# Patient Record
Sex: FEMALE | Race: BLACK OR AFRICAN AMERICAN | NOT HISPANIC OR LATINO | Employment: OTHER | URBAN - METROPOLITAN AREA
[De-identification: names, ages, dates, MRNs, and addresses within clinical notes are randomized per-mention and may not be internally consistent; named-entity substitution may affect disease eponyms.]

---

## 2019-06-27 ENCOUNTER — TELEPHONE (OUTPATIENT)
Dept: SPORTS MEDICINE | Facility: CLINIC | Age: 44
End: 2019-06-27

## 2019-06-27 NOTE — TELEPHONE ENCOUNTER
Moved pt. to earlier appt.     Sundeep Pruitt   Sports Medicine Assistant   [facility]     ----- Message from Lori Soto sent at 6/27/2019 11:50 AM CDT -----  Contact: self  Type:  Sooner Appointment Request    Patient has appointment scheduled with Dr Brown for 8/26/2019   Patient need appointment for one day next week    Name of Caller:  Patient   When is the first available appointment?  Symptoms: Tennis elbow  Would the patient rather a call back or a response via [facility]? call  Best Call Back Number: [phone]  Additional Information

## 2019-07-08 ENCOUNTER — OFFICE VISIT (OUTPATIENT)
Dept: ORTHOPEDICS | Facility: CLINIC | Age: 44
End: 2019-07-08
Payer: MEDICAID

## 2019-07-08 VITALS
HEIGHT: 66 IN | SYSTOLIC BLOOD PRESSURE: 120 MMHG | WEIGHT: 212 LBS | BODY MASS INDEX: 34.07 KG/M2 | DIASTOLIC BLOOD PRESSURE: 80 MMHG

## 2019-07-08 DIAGNOSIS — M25.521 RIGHT ELBOW PAIN: Primary | ICD-10-CM

## 2019-07-08 DIAGNOSIS — M79.631 RIGHT FOREARM PAIN: ICD-10-CM

## 2019-07-08 DIAGNOSIS — M77.11 RIGHT LATERAL EPICONDYLITIS: ICD-10-CM

## 2019-07-08 PROCEDURE — 99204 PR OFFICE/OUTPT VISIT, NEW, LEVL IV, 45-59 MIN: ICD-10-PCS | Mod: S$PBB,,, | Performed by: ORTHOPAEDIC SURGERY

## 2019-07-08 PROCEDURE — 99204 OFFICE O/P NEW MOD 45 MIN: CPT | Mod: S$PBB,,, | Performed by: ORTHOPAEDIC SURGERY

## 2019-07-08 PROCEDURE — 99213 OFFICE O/P EST LOW 20 MIN: CPT | Mod: PBBFAC,PN | Performed by: ORTHOPAEDIC SURGERY

## 2019-07-08 PROCEDURE — 99999 PR PBB SHADOW E&M-EST. PATIENT-LVL III: CPT | Mod: PBBFAC,,, | Performed by: ORTHOPAEDIC SURGERY

## 2019-07-08 PROCEDURE — 99999 PR PBB SHADOW E&M-EST. PATIENT-LVL III: ICD-10-PCS | Mod: PBBFAC,,, | Performed by: ORTHOPAEDIC SURGERY

## 2019-07-08 RX ORDER — MELOXICAM 15 MG/1
15 TABLET ORAL DAILY
Qty: 30 TABLET | Refills: 0 | Status: SHIPPED | OUTPATIENT
Start: 2019-07-08 | End: 2019-08-19 | Stop reason: SDUPTHER

## 2019-07-08 RX ORDER — IBUPROFEN 800 MG/1
800 TABLET ORAL 3 TIMES DAILY
COMMUNITY
End: 2019-07-08 | Stop reason: DRUGHIGH

## 2019-07-08 RX ORDER — LEVOTHYROXINE SODIUM 100 UG/1
100 TABLET ORAL DAILY
COMMUNITY

## 2019-07-08 RX ORDER — LORATADINE 10 MG/1
10 TABLET ORAL DAILY
COMMUNITY

## 2019-07-08 RX ORDER — PREDNISONE 10 MG/1
TABLET ORAL
Qty: 20 TABLET | Refills: 0 | Status: SHIPPED | OUTPATIENT
Start: 2019-07-08

## 2019-07-08 RX ORDER — TRAMADOL HYDROCHLORIDE 50 MG/1
50 TABLET ORAL 2 TIMES DAILY
COMMUNITY

## 2019-07-08 NOTE — LETTER
[facility]  [address]  [facility] [address]  Phone: [phone]  Fax: [phone]     Nan Dunbar was seen by Dr. Brown on 07/08/2019.     Please excuse Nan Dunbar from work the week of 7/8/19 to 7/12/19. She is permitted to return to work on 7/15/19.    Please do not hesitate to contact my office if there are any questions or concerns.    Sincerely,        Jakob Brown, DO

## 2019-07-08 NOTE — PROGRESS NOTES
"CC: right elbow pain    43 y.o. Female presents today for evaluation of her right elbow pain.   How long: Patient reports she has had elbow pain since February 2019. Patient reports her pain is in the back and side of her elbow and she feels a "sharp, shooting" pain. She reports she also has carpal tunnel. Patient denies any injury or trauma. She reports she has swelling and "hotness and sharp pains" down her arm.  When asked where it hurts, she points to the lateral aspect of her right elbow and motions down the dorsal aspect of her forearm as well as up the back of her upper arm along her triceps.  What makes it better: Patient reports she feels better with a hot shower and when using a heating pad.   What makes it worse: Patient reports she feels the worst when using her arm.    Does it radiate: Patient reports her fingers go numb and tingly. She reports she is unable to drive and complete work tasks on some days of the week due to this pain.   Attempted treatments: Patient had a Toradal shot at her ER visit in February and she had pain relief for about 2 days.  She has also worn a elbow sleeve, but states that she was told to discontinue with due to concern for swelling. She feels like the elbow sleeve did help while she was wearing it. She also had a carpal tunnel brace that was helping with her numbness and tingling as well. She was sent to physical therapy in March, but states that the treatment was too painful for her to continue.  She went for a few sessions.  Pain score: Patient reports her pain is 8/10 today.   Any mechanical symptoms: Patient denies any mechanical symptoms such as clicking or locking.   Feelings of instability: Patient reports instability in her arm. She cannot even lift her arm at times due to the pain.   Problems with ADLs: Patient works as a  at a dental school and reports she has been having difficulty completing work tasks off and on since February due to the right arm " pain. She also admits to pain with driving.    REVIEW OF SYSTEMS:   Constitution: Patient denies fever, chills, night sweats, and weight changes.  Eyes: Patient denies eye pain or vision changes.  HENT: Patient denies headache, ear pain, sore throat, or nasal discharge.  CVS: Patient denies chest pain.  Lungs: Patient denies shortness of breath or cough.  Abd: Patient denies stomach pain, nausea, or vomiting.  Skin: Patient denies skin rash or itching.    Hematologic/Lymphatic: Patient reports easy bruising.  Musculoskeletal: Patient denies recent falls. See HPI.  Psych: Patient denies any current anxiety or nervousness.    PAST MEDICAL HISTORY:   History reviewed. No pertinent past medical history.    PAST SURGICAL HISTORY:   History reviewed. No pertinent surgical history.    FAMILY HISTORY:   History reviewed. No pertinent family history.    SOCIAL HISTORY:   Social History     Socioeconomic History    Marital status: Single     Spouse name: Not on file    Number of children: Not on file    Years of education: Not on file    Highest education level: Not on file   Occupational History    Not on file   Social Needs    Financial resource strain: Not on file    Food insecurity:     Worry: Not on file     Inability: Not on file    Transportation needs:     Medical: Not on file     Non-medical: Not on file   Tobacco Use    Smoking status: Not on file   Substance and Sexual Activity    Alcohol use: Not on file    Drug use: Not on file    Sexual activity: Not on file   Lifestyle    Physical activity:     Days per week: Not on file     Minutes per session: Not on file    Stress: Not on file   Relationships    Social connections:     Talks on phone: Not on file     Gets together: Not on file     Attends Jehovah's witness service: Not on file     Active member of club or organization: Not on file     Attends meetings of clubs or organizations: Not on file     Relationship status: Not on file   Other Topics Concern  "   Not on file   Social History Narrative    Not on file       MEDICATIONS:     Current Outpatient Medications:     levothyroxine (SYNTHROID) 100 MCG tablet, Take 100 mcg by mouth once daily., Disp: , Rfl:     loratadine (CLARITIN) 10 mg tablet, Take 10 mg by mouth once daily., Disp: , Rfl:     traMADol (ULTRAM) 50 mg tablet, Take 50 mg by mouth 2 (two) times daily., Disp: , Rfl:     meloxicam (MOBIC) 15 MG tablet, Take 1 tablet (15 mg total) by mouth once daily., Disp: 30 tablet, Rfl: 0    predniSONE (DELTASONE) 10 MG tablet, Take 4 tablets by mouth daily for 5 days., Disp: 20 tablet, Rfl: 0    ALLERGIES:   Review of patient's allergies indicates:   Allergen Reactions    Flagyl [metronidazole] Hives    Vicodin [hydrocodone-acetaminophen] Hives        PHYSICAL EXAMINATION:  /80   Ht 5' 6" (1.676 m)   Wt 96.2 kg (212 lb)   BMI 34.22 kg/m²   Vitals signs and nursing note have been reviewed.  General: In no acute distress, well developed, well nourished, no diaphoresis  Eyes: EOM full and smooth, no eye redness or discharge  HENT: normocephalic and atraumatic, neck supple, trachea midline, no nasal discharge, no external ear redness or discharge  Cardiovascular: no LE edema  Lungs: respirations non-labored, no conversational dyspnea   Abd: non-distended, no rigidity  MSK: no amputation or deformity, no swelling of extremities  Neuro: AAOx3, CN2-12 grossly intact  Skin: No rashes, warm and dry  Psychiatric: cooperative, pleasant, mood and affect appropriate for age    Elbow: RIGHT   The affected elbow is compared to the contralateral elbow.    Observation:    There is no edema, erythema, or ecchymosis.  There is no obvious muscle atrophy, hypertonicity, or hypotonicity of arm muscles.  There is no abnormal carrying angle or gunstock deformity noted.    ROM:  Active flexion to 150° on left and 150° on right.    Active extension to 0° on left and 0° on right without hyperextension.   Active pronation to " 80° on left and 80° on right.    Active supination to 80° on left and 80° on right.    Active radial deviation to 20° on left and 20° on right.    Active ulnar deviation to 30° on left and 30° on right.    Tenderness To Palpation:  + tenderness at the medial epicondyle and lateral epicondyle, more intense at lateral epicondyle.  + tenderness at the olecranon.  No tenderness at the distal humerus or proximal radius/ulna.  No tenderness at the radial head.  No tenderness over the ulnar and radial collateral ligaments.  No tenderness over the posterior interosseous nerve or distal biceps tendon.  + tenderness over extensor musculature in forearm to light palpation  + tenderness over distal triceps muscle to light palpation    Strength Testing:  Deltoid - 5/5 on left and 5/5 on right  Biceps - 5/5 on left and 5/5 on right  Triceps - 5/5 on left and 5/5 on right  Wrist extension - 5/5 on left and 5/5 on right  Wrist flexion - 5/5 on left and 5/5 on right   - 5/5 on left and 5/5 on right  Finger extension - 5/5 on left and 5/5 on right  Finger abduction - 5/5 on left and 5/5 on right  Pain present with all strength testing on the right    Special Tests:  No laxity of the MCL at 0 and 30 degrees.    Milking maneuver - negative  No laxity of the LCL at 0 and 30 degrees.  No laxity with posterolateral and posteromedial rotary testing.    3rd digit extension resistance test - positive  Resisted supination - positive  Resisted pronation - positive  Resisted wrist extension (Cozen's test) - positive  Resisted wrist flexion - positive    Neurovascular Exam:  2+ radial pulses BL.  Sensation to light touch intact in the distal median, radial, and ulnar nerve distributions bilaterally.      IMAGIN. X-ray ordered due to right elbow pain.   2. X-ray images were reviewed personally by me and then directly with patient.  3. FINDINGS: X-ray images obtained demonstrate no acute fracture, dislocation, or bony erosion.  There is  mild osseous irregularity of the lateral humeral epicondyle consistent with sequela of remote injury.  4. IMPRESSION: As above.      ASSESSMENT:      ICD-10-CM ICD-9-CM   1. Right elbow pain M25.521 719.42   2. Right lateral epicondylitis M77.11 726.32   3. Right forearm pain M79.631 729.5       PLAN:  1-3.  Right elbow pain/lateral epicondylitis/forearm pain -     - Nan presents today for right elbow and arm pain starting in February 2019.  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  She states that she is right-hand dominant and she was told by her PCP that all of her pain is from overuse.  She has needed to miss some time at work due to her inability to complete work tasks due to her pain. She has found pain relief from steroid and Toradol injections, but only for short period of time. She also had physical therapy in March but was unable to complete any of it due to her pain.    - XRs ordered in the office today and images were personally reviewed with the patient. See above for further detail.    - I discussed the pathophysiology of lateral epicondylitis with her today. I advised that the mainstay of treatment is consistent anti-inflammatories, icing, and specific exercises.  She expressed understanding.  Lateral epicondylitis information handout provided to her today.  Specific exercises were on the handout and she was encouraged to do these once daily.  She was also permitted to wear elbow sleeve or to look into an OTC lateral epicondylitis strap to wear at work.  I discussed consistent icing for 15-20 minutes once to twice daily.  I also advised her to limit/avoid heat as this can make her pain and swelling worse once the heat is removed.    - To help with her acute pain, a short course of prednisone is reasonable.  Prednisone 40 mg daily for 5 days was prescribed.   Once prednisone is finished, she will start the Mobic 15 mg daily.  Discontinue ibuprofen 800 mg.    - Referral to occupational therapy has been placed. I advised that therapy when her pain is improved help provide her with long-term pain relief.    - Work note written for her today. She also provided me with [facility] work accommodation forms that were filled out for her today.  These will be scanned into Epic.      Future planning includes - if not improved with the above plan, consider lateral epicondylitis cortisone injection.    All questions were answered to the best of my ability and all concerns were addressed at this time.    Follow up in 4 weeks for above, or sooner if needed.      This note is dictated using the M*Modal Fluency Direct word recognition program. There are word recognition mistakes that are occasionally missed on review.

## 2019-08-19 ENCOUNTER — TELEPHONE (OUTPATIENT)
Dept: ORTHOPEDICS | Facility: CLINIC | Age: 44
End: 2019-08-19

## 2019-08-19 DIAGNOSIS — M77.11 RIGHT LATERAL EPICONDYLITIS: ICD-10-CM

## 2019-08-19 DIAGNOSIS — M79.631 RIGHT FOREARM PAIN: ICD-10-CM

## 2019-08-19 DIAGNOSIS — M25.521 RIGHT ELBOW PAIN: ICD-10-CM

## 2019-08-19 RX ORDER — MELOXICAM 15 MG/1
15 TABLET ORAL DAILY
Qty: 30 TABLET | Refills: 0 | Status: SHIPPED | OUTPATIENT
Start: 2019-08-19

## 2019-08-19 NOTE — TELEPHONE ENCOUNTER
Patient called in requesting refill of tramadol and meloxicam.  I have never prescribed her tramadol, but I did refill her meloxicam for her.

## 2021-03-30 ENCOUNTER — IMMUNIZATION (OUTPATIENT)
Dept: PRIMARY CARE CLINIC | Facility: CLINIC | Age: 46
End: 2021-03-30
Payer: MEDICAID

## 2021-03-30 DIAGNOSIS — Z23 NEED FOR VACCINATION: Primary | ICD-10-CM

## 2021-03-30 PROCEDURE — 91300 COVID-19, MRNA, LNP-S, PF, 30 MCG/0.3 ML DOSE VACCINE: ICD-10-PCS | Mod: S$GLB,,, | Performed by: INTERNAL MEDICINE

## 2021-03-30 PROCEDURE — 0001A COVID-19, MRNA, LNP-S, PF, 30 MCG/0.3 ML DOSE VACCINE: ICD-10-PCS | Mod: CV19,S$GLB,, | Performed by: INTERNAL MEDICINE

## 2021-03-30 PROCEDURE — 91300 COVID-19, MRNA, LNP-S, PF, 30 MCG/0.3 ML DOSE VACCINE: CPT | Mod: S$GLB,,, | Performed by: INTERNAL MEDICINE

## 2021-03-30 PROCEDURE — 0001A COVID-19, MRNA, LNP-S, PF, 30 MCG/0.3 ML DOSE VACCINE: CPT | Mod: CV19,S$GLB,, | Performed by: INTERNAL MEDICINE

## 2021-04-20 ENCOUNTER — IMMUNIZATION (OUTPATIENT)
Dept: PRIMARY CARE CLINIC | Facility: CLINIC | Age: 46
End: 2021-04-20

## 2021-04-20 DIAGNOSIS — Z23 NEED FOR VACCINATION: Primary | ICD-10-CM

## 2021-04-20 PROCEDURE — 0002A COVID-19, MRNA, LNP-S, PF, 30 MCG/0.3 ML DOSE VACCINE: CPT | Mod: CV19,S$GLB,, | Performed by: INTERNAL MEDICINE

## 2021-04-20 PROCEDURE — 0002A COVID-19, MRNA, LNP-S, PF, 30 MCG/0.3 ML DOSE VACCINE: ICD-10-PCS | Mod: CV19,S$GLB,, | Performed by: INTERNAL MEDICINE

## 2021-04-20 PROCEDURE — 91300 COVID-19, MRNA, LNP-S, PF, 30 MCG/0.3 ML DOSE VACCINE: CPT | Mod: S$GLB,,, | Performed by: INTERNAL MEDICINE

## 2021-04-20 PROCEDURE — 91300 COVID-19, MRNA, LNP-S, PF, 30 MCG/0.3 ML DOSE VACCINE: ICD-10-PCS | Mod: S$GLB,,, | Performed by: INTERNAL MEDICINE

## 2022-01-06 ENCOUNTER — LAB VISIT (OUTPATIENT)
Dept: PRIMARY CARE CLINIC | Facility: CLINIC | Age: 47
End: 2022-01-06
Payer: MEDICAID

## 2022-01-06 DIAGNOSIS — Z20.822 CONTACT WITH AND (SUSPECTED) EXPOSURE TO COVID-19: ICD-10-CM

## 2022-01-06 LAB
CTP QC/QA: YES
SARS-COV-2 AG RESP QL IA.RAPID: POSITIVE

## 2022-01-06 PROCEDURE — 87811 SARS-COV-2 COVID19 W/OPTIC: CPT

## 2022-01-14 ENCOUNTER — LAB VISIT (OUTPATIENT)
Dept: PRIMARY CARE CLINIC | Facility: CLINIC | Age: 47
End: 2022-01-14
Payer: MEDICAID

## 2022-01-14 DIAGNOSIS — Z20.822 CONTACT WITH AND (SUSPECTED) EXPOSURE TO COVID-19: ICD-10-CM

## 2022-01-14 LAB
CTP QC/QA: YES
SARS-COV-2 AG RESP QL IA.RAPID: NEGATIVE

## 2022-01-14 PROCEDURE — 87811 SARS-COV-2 COVID19 W/OPTIC: CPT

## 2025-03-10 ENCOUNTER — HOSPITAL ENCOUNTER (EMERGENCY)
Facility: HOSPITAL | Age: 50
Discharge: HOME | End: 2025-03-10
Attending: EMERGENCY MEDICINE
Payer: MEDICAID

## 2025-03-10 VITALS
SYSTOLIC BLOOD PRESSURE: 129 MMHG | RESPIRATION RATE: 20 BRPM | HEART RATE: 67 BPM | HEIGHT: 65 IN | DIASTOLIC BLOOD PRESSURE: 82 MMHG | TEMPERATURE: 98 F | BODY MASS INDEX: 31.49 KG/M2 | OXYGEN SATURATION: 96 % | WEIGHT: 189 LBS

## 2025-03-10 DIAGNOSIS — R07.9 CHEST PAIN: ICD-10-CM

## 2025-03-10 LAB
ALBUMIN SERPL BCP-MCNC: 4 G/DL (ref 3.5–5.2)
ALP SERPL-CCNC: 69 U/L (ref 40–150)
ALT SERPL W/O P-5'-P-CCNC: 5 U/L (ref 10–44)
ANION GAP SERPL CALC-SCNC: 9 MMOL/L (ref 8–16)
AST SERPL-CCNC: 15 U/L (ref 10–40)
BASOPHILS # BLD AUTO: 0.07 K/UL (ref 0–0.2)
BASOPHILS NFR BLD: 1.2 % (ref 0–1.9)
BILIRUB SERPL-MCNC: 0.8 MG/DL (ref 0.1–1)
BNP SERPL-MCNC: <10 PG/ML (ref 0–99)
BUN SERPL-MCNC: 8 MG/DL (ref 6–20)
CALCIUM SERPL-MCNC: 9.6 MG/DL (ref 8.7–10.5)
CHLORIDE SERPL-SCNC: 106 MMOL/L (ref 95–110)
CK SERPL-CCNC: 59 U/L (ref 20–180)
CO2 SERPL-SCNC: 24 MMOL/L (ref 23–29)
CREAT SERPL-MCNC: 0.7 MG/DL (ref 0.5–1.4)
DIFFERENTIAL METHOD BLD: ABNORMAL
EOSINOPHIL # BLD AUTO: 0 K/UL (ref 0–0.5)
EOSINOPHIL NFR BLD: 0.3 % (ref 0–8)
ERYTHROCYTE [DISTWIDTH] IN BLOOD BY AUTOMATED COUNT: 13.8 % (ref 11.5–14.5)
EST. GFR  (NO RACE VARIABLE): >60 ML/MIN/1.73 M^2
GLUCOSE SERPL-MCNC: 106 MG/DL (ref 70–110)
HCT VFR BLD AUTO: 41.6 % (ref 37–48.5)
HCV AB SERPL QL IA: NORMAL
HETEROPH AB SERPL QL IA: NEGATIVE
HGB BLD-MCNC: 12.8 G/DL (ref 12–16)
HIV 1+2 AB+HIV1 P24 AG SERPL QL IA: NORMAL
IMM GRANULOCYTES # BLD AUTO: 0.01 K/UL (ref 0–0.04)
IMM GRANULOCYTES NFR BLD AUTO: 0.2 % (ref 0–0.5)
INFLUENZA A, MOLECULAR: NEGATIVE
INFLUENZA B, MOLECULAR: NEGATIVE
LYMPHOCYTES # BLD AUTO: 1.6 K/UL (ref 1–4.8)
LYMPHOCYTES NFR BLD: 28.6 % (ref 18–48)
MCH RBC QN AUTO: 30.2 PG (ref 27–31)
MCHC RBC AUTO-ENTMCNC: 30.8 G/DL (ref 32–36)
MCV RBC AUTO: 98 FL (ref 82–98)
MONOCYTES # BLD AUTO: 0.6 K/UL (ref 0.3–1)
MONOCYTES NFR BLD: 10.5 % (ref 4–15)
NEUTROPHILS # BLD AUTO: 3.4 K/UL (ref 1.8–7.7)
NEUTROPHILS NFR BLD: 59.2 % (ref 38–73)
NRBC BLD-RTO: 0 /100 WBC
OHS QRS DURATION: 72 MS
OHS QTC CALCULATION: 456 MS
PLATELET # BLD AUTO: 308 K/UL (ref 150–450)
PMV BLD AUTO: 9.2 FL (ref 9.2–12.9)
POTASSIUM SERPL-SCNC: 3.6 MMOL/L (ref 3.5–5.1)
PROT SERPL-MCNC: 8.5 G/DL (ref 6–8.4)
RBC # BLD AUTO: 4.24 M/UL (ref 4–5.4)
SARS-COV-2 RDRP RESP QL NAA+PROBE: NEGATIVE
SODIUM SERPL-SCNC: 139 MMOL/L (ref 136–145)
SPECIMEN SOURCE: NORMAL
TROPONIN I SERPL DL<=0.01 NG/ML-MCNC: <3 NG/L (ref 0–14)
WBC # BLD AUTO: 5.74 K/UL (ref 3.9–12.7)

## 2025-03-10 PROCEDURE — 86803 HEPATITIS C AB TEST: CPT | Performed by: PHYSICIAN ASSISTANT

## 2025-03-10 PROCEDURE — 80053 COMPREHEN METABOLIC PANEL: CPT | Performed by: EMERGENCY MEDICINE

## 2025-03-10 PROCEDURE — 82550 ASSAY OF CK (CPK): CPT | Performed by: EMERGENCY MEDICINE

## 2025-03-10 PROCEDURE — 87389 HIV-1 AG W/HIV-1&-2 AB AG IA: CPT | Performed by: PHYSICIAN ASSISTANT

## 2025-03-10 PROCEDURE — 99285 EMERGENCY DEPT VISIT HI MDM: CPT | Mod: 25

## 2025-03-10 PROCEDURE — 63600175 PHARM REV CODE 636 W HCPCS: Mod: JZ,TB | Performed by: EMERGENCY MEDICINE

## 2025-03-10 PROCEDURE — 86308 HETEROPHILE ANTIBODY SCREEN: CPT | Performed by: EMERGENCY MEDICINE

## 2025-03-10 PROCEDURE — 84484 ASSAY OF TROPONIN QUANT: CPT | Performed by: EMERGENCY MEDICINE

## 2025-03-10 PROCEDURE — 85025 COMPLETE CBC W/AUTO DIFF WBC: CPT | Performed by: EMERGENCY MEDICINE

## 2025-03-10 PROCEDURE — 87635 SARS-COV-2 COVID-19 AMP PRB: CPT | Performed by: EMERGENCY MEDICINE

## 2025-03-10 PROCEDURE — 83880 ASSAY OF NATRIURETIC PEPTIDE: CPT | Performed by: EMERGENCY MEDICINE

## 2025-03-10 PROCEDURE — 96374 THER/PROPH/DIAG INJ IV PUSH: CPT

## 2025-03-10 PROCEDURE — 87502 INFLUENZA DNA AMP PROBE: CPT | Performed by: EMERGENCY MEDICINE

## 2025-03-10 RX ORDER — KETOROLAC TROMETHAMINE 30 MG/ML
15 INJECTION, SOLUTION INTRAMUSCULAR; INTRAVENOUS
Status: COMPLETED | OUTPATIENT
Start: 2025-03-10 | End: 2025-03-10

## 2025-03-10 RX ADMIN — KETOROLAC TROMETHAMINE 15 MG: 30 INJECTION, SOLUTION INTRAMUSCULAR; INTRAVENOUS at 02:03

## 2025-03-10 NOTE — ED NOTES
Patient identifiers verified and correct for Nan Dunbar   LOC: The patient is aao x4  APPEARANCE: Patient appears uncomfortable but in no acute distress  SKIN: The skin is warm and dry, color consistent with ethnicity, patient has normal skin turgor and moist mucus membranes, skin intact  MUSCULOSKELETAL: Patient moving all extremities spontaneously, no swelling noted.  RESPIRATORY: Airway is open and patent, respirations are spontaneous, patient has a normal effort and rate, no accessory muscle use noted, O2 sats noted at 99% on room air. +generalized weakness to BLE  CARDIAC: Patient has a normal rate and regular rhythm, no edema noted, capillary refill < 3 seconds. +Chest pain radiating to R arm and neck  GASTRO: Soft and non tender to palpation, no distention noted, normoactive bowel sounds present in all four quadrants. Pt states bowel movements have been regular.  : Pt denies any pain or frequency with urination.  NEURO: Pt opens eyes spontaneously, behavior appropriate to situation, follows commands, facial expression symmetrical, bilateral hand grasp equal and even, purposeful motor response noted, normal sensation in all extremities when touched with a finger. +H/A

## 2025-03-10 NOTE — ED PROVIDER NOTES
Encounter Date: 3/10/2025       History     Chief Complaint   Patient presents with    Chest Pain     Neck pain, r shoulder pain     HPI patient is a 49-year-old female with a past medical history remarkable for hypertension, hypothyroidism, anxiety who presents emergency department with multiple concerns.  There is a, the patient has noted diffuse body pain and aches, fatigue, chest pain, mild headache all in the setting of intermittent hypertension.  The patient explains that last Thursday she began feeling unwell and so checked her blood pressure which is greater than 200, walked outside and returned and noticed her pressure was still elevated and so went to an outside hospital by ambulance.  The patients laboratory studies were unremarkable.  The patient states that she was to be evaluated the next day with a nuclear medicine scan to look for a pheochromocytoma, which she was unable to complete because she started to feel too short of breath and anxious.  The patient was prescribed Atarax instructed to follow up with her endocrinologist.  The patient has noted pain that continues in her left anterior chest and her left shoulder as well as fatigue and body aches.  The patient goes on to explain that her symptoms of been present since 2023.   Review of patient's allergies indicates:   Allergen Reactions    Dexbrompheniramine-phenyleph Shortness Of Breath    Duloxetine Other (See Comments)     Tremors to body needed an Epi pen injections.    Flagyl [metronidazole] Hives    Penicillins Hives    Vicodin [hydrocodone-acetaminophen] Hives    Alprazolam Other (See Comments)     halucination    Oxycodone hcl Hives    Acetaminophen Palpitations and Rash     Tremors to body    Codeine Hives    Hydrocodone bitartrate Nausea And Vomiting     Tremors to body     Past Medical History:   Diagnosis Date    Anxiety disorder, unspecified     Gout, unspecified     Hypertension     Hypothyroidism, unspecified      History reviewed.  No pertinent surgical history.  No family history on file.  Social History[1]      Physical Exam     Initial Vitals [03/10/25 1105]   BP Pulse Resp Temp SpO2   (!) 142/76 79 18 98.2 °F (36.8 °C) 99 %      MAP       --         Physical Exam    Nursing note and vitals reviewed.  Constitutional: Patient appears well-developed and well-nourished. No distress.   HENT:   Head: Normocephalic and atraumatic.   Eyes: Conjunctivae and EOM are normal. Pupils are equal, round, and reactive to light.   Neck: Neck supple.   Normal range of motion.  Cardiovascular: Normal rate, regular rhythm, normal heart sounds and intact distal pulses.   Pulmonary/Chest: Breath sounds normal.   Abdominal: Abdomen is soft. Patient exhibits no distension. There is no abdominal tenderness.   Musculoskeletal:      Cervical back: Normal range of motion and neck supple.   Neurological: Patient is alert and oriented to person, place, and time. No cranial nerve deficit.  GCS score is 15.    Skin: Skin is warm and dry.  Psych:  Patient is tearful with interview    ED Course   Procedures  Labs Reviewed   CBC W/ AUTO DIFFERENTIAL - Abnormal       Result Value    WBC 5.74      RBC 4.24      Hemoglobin 12.8      Hematocrit 41.6      MCV 98      MCH 30.2      MCHC 30.8 (*)     RDW 13.8      Platelets 308      MPV 9.2      Immature Granulocytes 0.2      Gran # (ANC) 3.4      Immature Grans (Abs) 0.01      Lymph # 1.6      Mono # 0.6      Eos # 0.0      Baso # 0.07      nRBC 0      Gran % 59.2      Lymph % 28.6      Mono % 10.5      Eosinophil % 0.3      Basophil % 1.2      Differential Method Automated     COMPREHENSIVE METABOLIC PANEL - Abnormal    Sodium 139      Potassium 3.6      Chloride 106      CO2 24      Glucose 106      BUN 8      Creatinine 0.7      Calcium 9.6      Total Protein 8.5 (*)     Albumin 4.0      Total Bilirubin 0.8      Alkaline Phosphatase 69      AST 15      ALT 5 (*)     eGFR >60.0      Anion Gap 9     INFLUENZA A & B BY MOLECULAR     Influenza A, Molecular Negative      Influenza B, Molecular Negative      Flu A & B Source NP     HEPATITIS C ANTIBODY    Hepatitis C Ab Non-reactive      Narrative:     Release to patient->Immediate   HIV 1 / 2 ANTIBODY    HIV 1/2 Ag/Ab Non-reactive      Narrative:     Release to patient->Immediate   TROPONIN I HIGH SENSITIVITY    Troponin I High Sensitivity <3     B-TYPE NATRIURETIC PEPTIDE    BNP <10     CK    CPK 59     HETEROPHILE AB SCREEN    Monospot Negative     SARS-COV-2 RNA AMPLIFICATION, QUAL    SARS-CoV-2 RNA, Amplification, Qual Negative            Imaging Results              X-Ray Chest AP Portable (Final result)  Result time 03/10/25 13:17:18      Final result by Brendan Vicente MD (03/10/25 13:17:18)                   Impression:      See above      Electronically signed by: Brendan Vicente MD  Date:    03/10/2025  Time:    13:17               Narrative:    EXAMINATION:  XR CHEST AP PORTABLE    CLINICAL HISTORY:  Chest Pain;    TECHNIQUE:  Single frontal view of the chest was performed.    COMPARISON:  None    FINDINGS:  Cardiac size is normal.  Lungs are clear and well aerated.  No infiltrate or cardiac failure.                                       Medications   ketorolac injection 15 mg (15 mg Intravenous Given 3/10/25 1400)     Medical Decision Making  Amount and/or Complexity of Data Reviewed  Labs: ordered.  Radiology: ordered.    Risk  Prescription drug management.                     I have personally reviewed and interpreted the patients laboratory studies:  CK 59, troponin negative, BNP within normal limits, CMP within normal limits, CBC within normal limits, influenza and COVID negative  I have personally reviewed and interpreted imaging studies: CXR: I have personally reviewed the CXR. No evidence of consolidation, cardiomegaly, pulmonary edema, pneumothroax    I have personally reviewed and interpreted the patient's EKG:  Normal sinus rhythm at 71 beats per minute, QRS 72,  , no ischemic ST/T-wave changes      I have also reviewed the following:   external records:  Visit from 03/06/2025:  CK within normal limits, BNP within normal limits, CMP without electrolyte abnormalities, lipase within normal limits, high sensitivity troponin within normal limits, TSH within normal limits    The exact cause of the patient's symptoms is unclear, however, patient's vital signs, physical exam, and laboratory studies are reassuring.     Discussed with patient today's findings, and recommended close PMD f/u and patient was counseled on indications for immediate return to the Emergency Department.                           Clinical Impression:  Final diagnoses:  [R07.9] Chest pain          ED Disposition Condition    Discharge Stable          ED Prescriptions    None       Follow-up Information       Follow up With Specialties Details Why Contact Info    [facility] - Emergency Dept Emergency Medicine  If symptoms worsen, As needed [id number]  [facility] [address] [phone]                 [1]   Social History  Tobacco Use    Smoking status: Never    Smokeless tobacco: Never   Substance Use Topics    Alcohol use: Not Currently    Drug use: Never        Leann Mann MD  03/11/25 5510

## 2025-03-10 NOTE — ED TRIAGE NOTES
Pt arriving to ED c/o intermittent R sided chest pain radiating to right shoulder and R neck since Thursday. Went to two ER and had normal blood work and EKG. Was told it was anxiety, but pt states it feels different than panic attack. Takes Xanax, and last took today. Also endorses H/A, weakness to bilateral legs

## 2025-03-10 NOTE — FIRST PROVIDER EVALUATION
"Medical screening examination initiated.  I have conducted a focused provider triage encounter, findings are as follows:    Brief history of present illness:  50 yo F w/ CP, neck pain, nausea w/o vomiting. Seen for same on 3/5, 3/6 at [facility] ED. Neg hsTnI noted on 3/6, 3/5, 2/12. Regular visits for same since 10/2/24    Vitals:    03/10/25 1105   BP: (!) 142/76   Pulse: 79   Resp: 18   Temp: 98.2 °F (36.8 °C)   TempSrc: Oral   SpO2: 99%   Weight: 85.7 kg (189 lb)   Height: 5' 5" (1.651 m)       Pertinent physical exam:  nontoxic well appearing    Brief workup plan:  ecg. Zofran odt    Preliminary workup initiated; this workup will be continued and followed by the physician or advanced practice provider that is assigned to the patient when roomed. OBf/u 3/17, cardiology f/u 4/29  "

## 2025-07-18 DIAGNOSIS — M25.561 ACUTE PAIN OF BOTH KNEES: Primary | ICD-10-CM

## 2025-07-18 DIAGNOSIS — M25.562 ACUTE PAIN OF BOTH KNEES: Primary | ICD-10-CM
